# Patient Record
Sex: MALE | Race: BLACK OR AFRICAN AMERICAN | NOT HISPANIC OR LATINO | Employment: OTHER | URBAN - METROPOLITAN AREA
[De-identification: names, ages, dates, MRNs, and addresses within clinical notes are randomized per-mention and may not be internally consistent; named-entity substitution may affect disease eponyms.]

---

## 2021-07-22 ENCOUNTER — IMMUNIZATION (OUTPATIENT)
Dept: PRIMARY CARE CLINIC | Facility: CLINIC | Age: 36
End: 2021-07-22
Payer: MEDICAID

## 2021-07-22 DIAGNOSIS — Z23 NEED FOR VACCINATION: Primary | ICD-10-CM

## 2021-07-22 PROCEDURE — 91300 COVID-19, MRNA, LNP-S, PF, 30 MCG/0.3 ML DOSE VACCINE: CPT | Mod: S$GLB,,, | Performed by: INTERNAL MEDICINE

## 2021-07-22 PROCEDURE — 0001A COVID-19, MRNA, LNP-S, PF, 30 MCG/0.3 ML DOSE VACCINE: CPT | Mod: CV19,S$GLB,, | Performed by: INTERNAL MEDICINE

## 2021-07-22 PROCEDURE — 0001A COVID-19, MRNA, LNP-S, PF, 30 MCG/0.3 ML DOSE VACCINE: ICD-10-PCS | Mod: CV19,S$GLB,, | Performed by: INTERNAL MEDICINE

## 2021-07-22 PROCEDURE — 91300 COVID-19, MRNA, LNP-S, PF, 30 MCG/0.3 ML DOSE VACCINE: ICD-10-PCS | Mod: S$GLB,,, | Performed by: INTERNAL MEDICINE

## 2021-08-12 ENCOUNTER — IMMUNIZATION (OUTPATIENT)
Dept: PRIMARY CARE CLINIC | Facility: CLINIC | Age: 36
End: 2021-08-12
Payer: MEDICAID

## 2021-08-12 DIAGNOSIS — Z23 NEED FOR VACCINATION: Primary | ICD-10-CM

## 2021-08-12 PROCEDURE — 91300 COVID-19, MRNA, LNP-S, PF, 30 MCG/0.3 ML DOSE VACCINE: CPT | Mod: S$GLB,,, | Performed by: INTERNAL MEDICINE

## 2021-08-12 PROCEDURE — 91300 COVID-19, MRNA, LNP-S, PF, 30 MCG/0.3 ML DOSE VACCINE: ICD-10-PCS | Mod: S$GLB,,, | Performed by: INTERNAL MEDICINE

## 2021-08-12 PROCEDURE — 0002A COVID-19, MRNA, LNP-S, PF, 30 MCG/0.3 ML DOSE VACCINE: ICD-10-PCS | Mod: CV19,S$GLB,, | Performed by: INTERNAL MEDICINE

## 2021-08-12 PROCEDURE — 0002A COVID-19, MRNA, LNP-S, PF, 30 MCG/0.3 ML DOSE VACCINE: CPT | Mod: CV19,S$GLB,, | Performed by: INTERNAL MEDICINE

## 2025-03-05 ENCOUNTER — HOSPITAL ENCOUNTER (EMERGENCY)
Facility: HOSPITAL | Age: 40
Discharge: PSYCHIATRIC OR SUBSTANCE USE FACILITY | End: 2025-03-05
Attending: EMERGENCY MEDICINE
Payer: COMMERCIAL

## 2025-03-05 VITALS
SYSTOLIC BLOOD PRESSURE: 124 MMHG | DIASTOLIC BLOOD PRESSURE: 82 MMHG | OXYGEN SATURATION: 97 % | RESPIRATION RATE: 18 BRPM | HEART RATE: 93 BPM | TEMPERATURE: 98 F

## 2025-03-05 DIAGNOSIS — Z00.8 MEDICAL CLEARANCE FOR PSYCHIATRIC ADMISSION: ICD-10-CM

## 2025-03-05 DIAGNOSIS — F29 PSYCHOSIS, UNSPECIFIED PSYCHOSIS TYPE: ICD-10-CM

## 2025-03-05 DIAGNOSIS — F22 PARANOIA: Primary | ICD-10-CM

## 2025-03-05 DIAGNOSIS — Z91.148 H/O MEDICATION NONCOMPLIANCE: ICD-10-CM

## 2025-03-05 DIAGNOSIS — R07.9 CHEST PAIN: ICD-10-CM

## 2025-03-05 DIAGNOSIS — R82.5 POSITIVE URINE DRUG SCREEN: ICD-10-CM

## 2025-03-05 LAB
ALBUMIN SERPL BCP-MCNC: 3.9 G/DL (ref 3.5–5.2)
ALP SERPL-CCNC: 168 U/L (ref 40–150)
ALT SERPL W/O P-5'-P-CCNC: 71 U/L (ref 10–44)
AMPHET+METHAMPHET UR QL: NEGATIVE
ANION GAP SERPL CALC-SCNC: 10 MMOL/L (ref 8–16)
APAP SERPL-MCNC: <3 UG/ML (ref 10–20)
AST SERPL-CCNC: 87 U/L (ref 10–40)
BACTERIA #/AREA URNS HPF: ABNORMAL /HPF
BARBITURATES UR QL SCN>200 NG/ML: NEGATIVE
BASOPHILS # BLD AUTO: 0.05 K/UL (ref 0–0.2)
BASOPHILS NFR BLD: 0.6 % (ref 0–1.9)
BENZODIAZ UR QL SCN>200 NG/ML: NEGATIVE
BILIRUB SERPL-MCNC: 0.7 MG/DL (ref 0.1–1)
BILIRUB UR QL STRIP: NEGATIVE
BNP SERPL-MCNC: <10 PG/ML (ref 0–99)
BUN SERPL-MCNC: 10 MG/DL (ref 6–20)
BZE UR QL SCN: NEGATIVE
CALCIUM SERPL-MCNC: 9.1 MG/DL (ref 8.7–10.5)
CANNABINOIDS UR QL SCN: ABNORMAL
CHLORIDE SERPL-SCNC: 101 MMOL/L (ref 95–110)
CLARITY UR: CLEAR
CO2 SERPL-SCNC: 24 MMOL/L (ref 23–29)
COLOR UR: YELLOW
CREAT SERPL-MCNC: 1.1 MG/DL (ref 0.5–1.4)
CREAT UR-MCNC: 128.1 MG/DL (ref 23–375)
DIFFERENTIAL METHOD BLD: ABNORMAL
EOSINOPHIL # BLD AUTO: 0.5 K/UL (ref 0–0.5)
EOSINOPHIL NFR BLD: 5.5 % (ref 0–8)
ERYTHROCYTE [DISTWIDTH] IN BLOOD BY AUTOMATED COUNT: 11.2 % (ref 11.5–14.5)
EST. GFR  (NO RACE VARIABLE): >60 ML/MIN/1.73 M^2
ETHANOL SERPL-MCNC: <10 MG/DL
GLUCOSE SERPL-MCNC: 91 MG/DL (ref 70–110)
GLUCOSE UR QL STRIP: NEGATIVE
HCT VFR BLD AUTO: 39.4 % (ref 40–54)
HGB BLD-MCNC: 13 G/DL (ref 14–18)
HGB UR QL STRIP: ABNORMAL
IMM GRANULOCYTES # BLD AUTO: 0.02 K/UL (ref 0–0.04)
IMM GRANULOCYTES NFR BLD AUTO: 0.2 % (ref 0–0.5)
KETONES UR QL STRIP: NEGATIVE
LEUKOCYTE ESTERASE UR QL STRIP: ABNORMAL
LIPASE SERPL-CCNC: 21 U/L (ref 4–60)
LYMPHOCYTES # BLD AUTO: 2.2 K/UL (ref 1–4.8)
LYMPHOCYTES NFR BLD: 25 % (ref 18–48)
MCH RBC QN AUTO: 32.3 PG (ref 27–31)
MCHC RBC AUTO-ENTMCNC: 33 G/DL (ref 32–36)
MCV RBC AUTO: 98 FL (ref 82–98)
METHADONE UR QL SCN>300 NG/ML: NEGATIVE
MICROSCOPIC COMMENT: ABNORMAL
MONOCYTES # BLD AUTO: 0.9 K/UL (ref 0.3–1)
MONOCYTES NFR BLD: 10.4 % (ref 4–15)
NEUTROPHILS # BLD AUTO: 5.1 K/UL (ref 1.8–7.7)
NEUTROPHILS NFR BLD: 58.3 % (ref 38–73)
NITRITE UR QL STRIP: NEGATIVE
NRBC BLD-RTO: 0 /100 WBC
OPIATES UR QL SCN: NEGATIVE
PCP UR QL SCN>25 NG/ML: NEGATIVE
PH UR STRIP: 6 [PH] (ref 5–8)
PLATELET # BLD AUTO: 291 K/UL (ref 150–450)
PMV BLD AUTO: 10 FL (ref 9.2–12.9)
POTASSIUM SERPL-SCNC: 3.5 MMOL/L (ref 3.5–5.1)
PROT SERPL-MCNC: 8.2 G/DL (ref 6–8.4)
PROT UR QL STRIP: NEGATIVE
RBC # BLD AUTO: 4.02 M/UL (ref 4.6–6.2)
RBC #/AREA URNS HPF: 3 /HPF (ref 0–4)
SODIUM SERPL-SCNC: 135 MMOL/L (ref 136–145)
SP GR UR STRIP: 1.01 (ref 1–1.03)
SQUAMOUS #/AREA URNS HPF: 2 /HPF
TOXICOLOGY INFORMATION: ABNORMAL
TROPONIN I SERPL DL<=0.01 NG/ML-MCNC: <0.006 NG/ML (ref 0–0.03)
URN SPEC COLLECT METH UR: ABNORMAL
UROBILINOGEN UR STRIP-ACNC: NEGATIVE EU/DL
WBC # BLD AUTO: 8.77 K/UL (ref 3.9–12.7)
WBC #/AREA URNS HPF: 6 /HPF (ref 0–5)

## 2025-03-05 PROCEDURE — 80074 ACUTE HEPATITIS PANEL: CPT | Performed by: PHYSICIAN ASSISTANT

## 2025-03-05 PROCEDURE — 84484 ASSAY OF TROPONIN QUANT: CPT | Performed by: PHYSICIAN ASSISTANT

## 2025-03-05 PROCEDURE — 80307 DRUG TEST PRSMV CHEM ANLYZR: CPT | Performed by: PHYSICIAN ASSISTANT

## 2025-03-05 PROCEDURE — 63600175 PHARM REV CODE 636 W HCPCS: Performed by: PHYSICIAN ASSISTANT

## 2025-03-05 PROCEDURE — 25000003 PHARM REV CODE 250: Performed by: PHYSICIAN ASSISTANT

## 2025-03-05 PROCEDURE — 81000 URINALYSIS NONAUTO W/SCOPE: CPT | Performed by: PHYSICIAN ASSISTANT

## 2025-03-05 PROCEDURE — 80143 DRUG ASSAY ACETAMINOPHEN: CPT | Performed by: PHYSICIAN ASSISTANT

## 2025-03-05 PROCEDURE — 85025 COMPLETE CBC W/AUTO DIFF WBC: CPT | Performed by: PHYSICIAN ASSISTANT

## 2025-03-05 PROCEDURE — 82077 ASSAY SPEC XCP UR&BREATH IA: CPT | Performed by: PHYSICIAN ASSISTANT

## 2025-03-05 PROCEDURE — 83880 ASSAY OF NATRIURETIC PEPTIDE: CPT | Performed by: PHYSICIAN ASSISTANT

## 2025-03-05 PROCEDURE — 93010 ELECTROCARDIOGRAM REPORT: CPT | Mod: ,,, | Performed by: INTERNAL MEDICINE

## 2025-03-05 PROCEDURE — 99285 EMERGENCY DEPT VISIT HI MDM: CPT | Mod: 25

## 2025-03-05 PROCEDURE — 83690 ASSAY OF LIPASE: CPT | Performed by: PHYSICIAN ASSISTANT

## 2025-03-05 PROCEDURE — 93005 ELECTROCARDIOGRAM TRACING: CPT

## 2025-03-05 PROCEDURE — 96372 THER/PROPH/DIAG INJ SC/IM: CPT | Performed by: PHYSICIAN ASSISTANT

## 2025-03-05 PROCEDURE — 80053 COMPREHEN METABOLIC PANEL: CPT | Performed by: PHYSICIAN ASSISTANT

## 2025-03-05 RX ORDER — HALOPERIDOL LACTATE 5 MG/ML
5 INJECTION, SOLUTION INTRAMUSCULAR EVERY 6 HOURS PRN
Status: DISCONTINUED | OUTPATIENT
Start: 2025-03-05 | End: 2025-03-06 | Stop reason: HOSPADM

## 2025-03-05 RX ORDER — CETIRIZINE HYDROCHLORIDE 10 MG/1
10 TABLET ORAL
Status: COMPLETED | OUTPATIENT
Start: 2025-03-05 | End: 2025-03-05

## 2025-03-05 RX ORDER — HALOPERIDOL LACTATE 5 MG/ML
5 INJECTION, SOLUTION INTRAMUSCULAR
Status: DISCONTINUED | OUTPATIENT
Start: 2025-03-05 | End: 2025-03-05

## 2025-03-05 RX ORDER — DIPHENHYDRAMINE HYDROCHLORIDE 50 MG/ML
50 INJECTION, SOLUTION INTRAMUSCULAR; INTRAVENOUS DAILY PRN
Status: DISCONTINUED | OUTPATIENT
Start: 2025-03-05 | End: 2025-03-06 | Stop reason: HOSPADM

## 2025-03-05 RX ORDER — LORAZEPAM 2 MG/ML
2 INJECTION INTRAMUSCULAR
Status: DISCONTINUED | OUTPATIENT
Start: 2025-03-05 | End: 2025-03-05

## 2025-03-05 RX ORDER — DIPHENHYDRAMINE HYDROCHLORIDE 50 MG/ML
50 INJECTION, SOLUTION INTRAMUSCULAR; INTRAVENOUS
Status: DISCONTINUED | OUTPATIENT
Start: 2025-03-05 | End: 2025-03-05

## 2025-03-05 RX ORDER — LORAZEPAM 2 MG/ML
2 INJECTION INTRAMUSCULAR DAILY PRN
Status: DISCONTINUED | OUTPATIENT
Start: 2025-03-05 | End: 2025-03-06 | Stop reason: HOSPADM

## 2025-03-05 RX ADMIN — CETIRIZINE HYDROCHLORIDE 10 MG: 10 TABLET, FILM COATED ORAL at 04:03

## 2025-03-05 RX ADMIN — DIPHENHYDRAMINE HYDROCHLORIDE 50 MG: 50 INJECTION INTRAMUSCULAR; INTRAVENOUS at 04:03

## 2025-03-05 RX ADMIN — HALOPERIDOL LACTATE 5 MG: 5 INJECTION, SOLUTION INTRAMUSCULAR at 04:03

## 2025-03-05 RX ADMIN — LORAZEPAM 2 MG: 2 INJECTION INTRAMUSCULAR; INTRAVENOUS at 04:03

## 2025-03-05 NOTE — ED PROVIDER NOTES
"Encounter Date: 3/5/2025       History     Chief Complaint   Patient presents with    Psychiatric Evaluation     Pt arrived via ems, pt chief complaint is a psychiatric evaluation. Pt was sent to ed under OPC by pt uncle for statements saying he wants to die, also pt is talking to his mother that is not there. Pt has also been staying a hotel and spreading his feces all over the walls.        CC: Psychiatric Evaluation    HPI;   39 y.o. M with hx of schizophrenia and bipolar disorder presenting to ED via police with OPC for psychiatric evaluation.     History obtained from OPC:  uncle claims that patient keeps saying that he wants to die and has been verbally aggressive.  Claims the patient is over eating, rarely bathing or sleeping.  He states the patient has delusional --thinking that he is talking to his mother when he has not spoken to his mother in a month due to being kicked out of her home. Patient uncle states the patient was yellowing at people who were not there when he allowed the patient to stay with him.  He said the patient to stay at [facility] in [place] yesterday and the patient spread his feces all over the hotel room that he was staying in.      History obtained from pt:   Pt is c/o chest pain. He denies history of similar CP. Denies fever, chills, SOB, abdominal pain, nausea, vomiting. He reports he had some diarrhea this morning.Asked pt if he ate something that upset his stomach and he repeatedly keeps talking about the "keys in kitchen and the back of the kitchen."    When questioned about the incident with feces in hotel room, pt became paranoid and irritable and said "who told you that?" He denies alcohol or illicit drug use. Does smoke cigarettes. Denies SI/HI/AH/VH, sleep or appetite disturbance. Pt states he is compliant with all his medications. He cannot recall the names of them.   He states he used to live with his mother until 1 month ago. He states he stays with his uncle now but " "will not disclose what happened with his mother. He also states his uncle sent him to stay at a hotel but will not disclose why he was sent there.     He is also c/o "my sinuses." Has chronic nasal congestion, rhinorrhea. Denies difficulty breathing or swallowing, cough. Takes clarinex prn  Pt said "promise me I can go home today."  History obtained from uncle:   Uncle reports pt's mother kicked him out of her house because patient was "acting out" but unsure why. Patient has been living with uncle for 1 month "so he wouldn't be on the streets." He reports he was screaming at night and talking to his mom who is not there which has been disturbing his uncle's family. For this reason, pt's uncle brought pt to hotel.    Uncle Reports patient had IP psychiatric admission at [facility] 2 weeks ago for 1 week. Reports patient is still screaming at night and having auditory hallucinations since his discharge.    Talked to patients nurseRacquel at [phone] because uncle was unsure what the pt's medications were  She reports that he is on Trazodone 100 mg nightly and IM Invega sustenna. He had his last injection end of last month.     The history is provided by the patient, medical records and the police.     Review of patient's allergies indicates:   Allergen Reactions    Iodine and iodide containing products     Penicillins Hives    Shellfish containing products      Past Medical History:   Diagnosis Date    Bipolar 1 disorder     Gallstones     Sinus congestion     Torn ligament left knee     Past Surgical History:   Procedure Laterality Date    KNEE SURGERY  2005    left . torn ligament    TYMPANOSTOMY TUBE PLACEMENT       Family History   Problem Relation Name Age of Onset    Hypertension Mother      Arthritis Mother      Mental illness Neg Hx       Social History[1]  Review of Systems   Psychiatric/Behavioral:  Positive for agitation, behavioral problems and hallucinations. Negative for confusion, " "self-injury, sleep disturbance and suicidal ideas. The patient is not nervous/anxious.        Physical Exam     Initial Vitals [03/05/25 1446]   BP Pulse Resp Temp SpO2   119/69 101 18 98.3 °F (36.8 °C) 99 %      MAP       --         Physical Exam    Nursing note and vitals reviewed.  Constitutional: He appears well-developed and well-nourished. No distress.   HENT:   Head: Normocephalic.   Right Ear: External ear normal.   Left Ear: External ear normal. Mouth/Throat: Uvula is midline and mucous membranes are normal. No oropharyngeal exudate, posterior oropharyngeal edema or posterior oropharyngeal erythema.   Postnasal drip  Pt spitting clear sputum into emesis bag     Eyes: Conjunctivae are normal.   Pulmonary/Chest: No respiratory distress.   Abdominal: Abdomen is flat. There is no abdominal tenderness.   No right CVA tenderness.  No left CVA tenderness. There is no rebound, no guarding, no tenderness at McBurney's point and negative Davis's sign. negative Rovsing's sign  Musculoskeletal:         General: Normal range of motion.     Neurological: He is alert.   Skin: Skin is warm and dry. No rash noted.   Psychiatric: His speech is tangential. He is agitated. He is not actively hallucinating. Thought content is paranoid. Cognition and memory are impaired. He expresses no homicidal and no suicidal ideation. He expresses no suicidal plans and no homicidal plans.   Irritable.     Talking about "keys in the kitchen" when asked about food he ate yesterday             ED Course   Procedures  Labs Reviewed   CBC W/ AUTO DIFFERENTIAL - Abnormal       Result Value    WBC 8.77      RBC 4.02 (*)     Hemoglobin 13.0 (*)     Hematocrit 39.4 (*)     MCV 98      MCH 32.3 (*)     MCHC 33.0      RDW 11.2 (*)     Platelets 291      MPV 10.0      Immature Granulocytes 0.2      Gran # (ANC) 5.1      Immature Grans (Abs) 0.02      Lymph # 2.2      Mono # 0.9      Eos # 0.5      Baso # 0.05      nRBC 0      Gran % 58.3      Lymph % " 25.0      Mono % 10.4      Eosinophil % 5.5      Basophil % 0.6      Differential Method Automated     COMPREHENSIVE METABOLIC PANEL - Abnormal    Sodium 135 (*)     Potassium 3.5      Chloride 101      CO2 24      Glucose 91      BUN 10      Creatinine 1.1      Calcium 9.1      Total Protein 8.2      Albumin 3.9      Total Bilirubin 0.7      Alkaline Phosphatase 168 (*)     AST 87 (*)     ALT 71 (*)     eGFR >60      Anion Gap 10     URINALYSIS, REFLEX TO URINE CULTURE - Abnormal    Specimen UA Urine, Clean Catch      Color, UA Yellow      Appearance, UA Clear      pH, UA 6.0      Specific Gravity, UA 1.010      Protein, UA Negative      Glucose, UA Negative      Ketones, UA Negative      Bilirubin (UA) Negative      Occult Blood UA 2+ (*)     Nitrite, UA Negative      Urobilinogen, UA Negative      Leukocytes, UA 2+ (*)     Narrative:     Specimen Source->Urine   DRUG SCREEN PANEL, URINE EMERGENCY - Abnormal    Benzodiazepines Negative      Methadone metabolites Negative      Cocaine (Metab.) Negative      Opiate Scrn, Ur Negative      Barbiturate Screen, Ur Negative      Amphetamine Screen, Ur Negative      THC Presumptive Positive (*)     Phencyclidine Negative      Creatinine, Urine 128.1      Toxicology Information SEE COMMENT      Narrative:     Specimen Source->Urine   ACETAMINOPHEN LEVEL - Abnormal    Acetaminophen (Tylenol), Serum <3.0 (*)    URINALYSIS MICROSCOPIC - Abnormal    RBC, UA 3      WBC, UA 6 (*)     Bacteria Rare      Squam Epithel, UA 2      Microscopic Comment SEE COMMENT      Narrative:     Specimen Source->Urine   ALCOHOL,MEDICAL (ETHANOL)    Alcohol, Serum <10     LIPASE    Lipase 21     TROPONIN I    Troponin I <0.006     B-TYPE NATRIURETIC PEPTIDE    BNP <10     HEPATITIS PANEL, ACUTE   C. TRACHOMATIS/N. GONORRHOEAE BY AMP DNA   HEPATITIS PANEL, ACUTE     EKG Readings: (Independently Interpreted)   Initial Reading: No STEMI. Rhythm: Normal Sinus Rhythm. Heart Rate: 74. Ectopy: No Ectopy.  Conduction: Normal. Axis: Right Axis Deviation. Other Impression:  QRS 86 Qtc 437   Independent interpretation         Imaging Results              X-Ray Chest AP Portable (Final result)  Result time 03/05/25 15:48:44      Final result by Gonzalez Arauz III, MD (03/05/25 15:48:44)                   Impression:      No acute process seen.      Electronically signed by: Gonzalez Arauz MD  Date:    03/05/2025  Time:    15:48               Narrative:    EXAMINATION:  XR CHEST AP PORTABLE    CLINICAL HISTORY:  Chest pain, unspecified    FINDINGS:  Chest chest one view:    Heart size is normal.  Lungs are clear and the bones are noncontributory.                                       Medications   diphenhydrAMINE injection 50 mg (has no administration in time range)   LORazepam injection 2 mg (has no administration in time range)   haloperidol lactate injection 5 mg (has no administration in time range)   cetirizine tablet 10 mg (10 mg Oral Given 3/5/25 1606)     Medical Decision Making  Ddx includes acute psychotic episode, SI/danger to self, HI/danger to others, but also toxidrome, withdrawal (eg from EtOH or BZD therapy), electrolyte derangement, serotonin syndrome, unusual pathology like anti-NMDA receptor encephalitis, other.  Will get basic screening psychiatric labs on the patient and reassess.  Will sedate patient if necessary.  Patient was placed on a PEC due to gravely disabled, psychosis with delusions. Pt is calm currently but is paranoid and appears to be on edge.   B52 PRN ordered for nondirectible agitation    Pt then sitting with his legs crossed and placing his forehead against the wall.    Patient underwent a work up in the emergency department including labs. UDS positive for THC. Troponin normal. Doubt ACS. BNP normal. Doubt CHF. CXR negative for PNA PTX or acute abnormality. Heart score 0. EKG with no malignant arrhythmia or acute ischemia. LFTs slightly elevated however has been higher in the  "past. Hepatitis panel added. Abdomen soft nontender. Denies abdominal pain. Doubt cholecystitis, choledocholithiasis cholangitis or acute abdomen. May be 2/2 alcohol use. no acute organic cause of the patient's current psychiatric illness has been identified at this time. Incidental finding of sterile pyuria. GC sent.     Patient medically cleared for psychiatric evaluation.     1638-Pt is angry and requesting to go home. He states "you promised me I could go home you told me" which I did not tell the pt. He is asking to call his uncle on his phone which I informed the patient he can not do.  He is asked me to tell his uncle that he will take his medications and behave. He states his uncle and the officers told him he could go home today, not tomorrow and that he is not here to stay    Instructed to give B52 due to agitation  Please put in 35 minutes of critical care due to patient having a high risk of respiratory failure.   Separate from teaching and exclusive of procedure and ekg time  Includes:  Time at bedside  Time reviewing test results  Time discussing case with staff  Time documenting the medical record  Time spent with family members  Time spent with consults  Management     Discussed with Dr. Bundy who agrees with assessment and plan.     Amount and/or Complexity of Data Reviewed  Labs: ordered.  Radiology: ordered.    Risk  OTC drugs.  Prescription drug management.      Additional MDM:   Heart Score:    History:          Slightly suspicious.  ECG:             Normal  Age:               Less than 45 years  Risk factors: no risk factors known  Troponin:       Less than or equal to normal limit  Heart Score = 0                                       Clinical Impression:  Final diagnoses:  [R07.9] Chest pain  [Z00.8] Medical clearance for psychiatric admission  [R82.5] Positive urine drug screen  [F29] Psychosis, unspecified psychosis type  [F22] Paranoia (Primary)  [Z91.148] H/O medication noncompliance   "        ED Disposition Condition    Transfer to Psych Facility Stable          ED Prescriptions    None       Follow-up Information    None            Bree Nelson PA-C  03/05/25 1634       Bree Nelson PA-C  03/05/25 1640         [1]   Social History  Tobacco Use    Smoking status: Every Day     Current packs/day: 1.00     Average packs/day: 1 pack/day for 10.0 years (10.0 ttl pk-yrs)     Types: Cigarettes   Substance Use Topics    Alcohol use: No     Alcohol/week: 0.0 standard drinks of alcohol    Drug use: No        Bree Nelson PA-C  03/05/25 1650       Bree Nelson PA-C  03/05/25 1650

## 2025-03-06 LAB
HAV IGM SERPL QL IA: NORMAL
HBV CORE IGM SERPL QL IA: NORMAL
HBV SURFACE AG SERPL QL IA: NORMAL
HCV AB SERPL QL IA: NORMAL
OHS QRS DURATION: 86 MS
OHS QTC CALCULATION: 437 MS

## 2025-03-06 NOTE — ED NOTES
Pt assisted to EMS stretcher without incident. Valuables and belongings given to EMS. Hospital security  escorted  pt to EMS unit without incident or change in condition. Pt denies notifying NOK of transfer.

## 2025-03-06 NOTE — ED NOTES
Assumed care. Pt resting quietly in bed. NAD noted. 1:1 sitter at bedside. Pending psych placement.

## 2025-07-22 ENCOUNTER — HOSPITAL ENCOUNTER (EMERGENCY)
Facility: HOSPITAL | Age: 40
Discharge: HOME | End: 2025-07-22
Attending: STUDENT IN AN ORGANIZED HEALTH CARE EDUCATION/TRAINING PROGRAM
Payer: COMMERCIAL

## 2025-07-22 VITALS
OXYGEN SATURATION: 98 % | DIASTOLIC BLOOD PRESSURE: 67 MMHG | HEART RATE: 86 BPM | BODY MASS INDEX: 32.2 KG/M2 | RESPIRATION RATE: 18 BRPM | TEMPERATURE: 98 F | WEIGHT: 230 LBS | HEIGHT: 71 IN | SYSTOLIC BLOOD PRESSURE: 109 MMHG

## 2025-07-22 DIAGNOSIS — Z04.6 ENCOUNTER FOR GENERAL PSYCHIATRIC EXAMINATION, REQUESTED BY AUTHORITY: Primary | ICD-10-CM

## 2025-07-22 LAB
ABSOLUTE EOSINOPHIL (OHS): 0.79 K/UL
ABSOLUTE MONOCYTE (OHS): 0.61 K/UL (ref 0.3–1)
ABSOLUTE NEUTROPHIL COUNT (OHS): 3.26 K/UL (ref 1.8–7.7)
ALBUMIN SERPL BCP-MCNC: 3.5 G/DL (ref 3.5–5.2)
ALP SERPL-CCNC: 138 UNIT/L (ref 40–150)
ALT SERPL W/O P-5'-P-CCNC: 35 UNIT/L (ref 10–44)
AMPHET UR QL SCN: NEGATIVE
ANION GAP (OHS): 9 MMOL/L (ref 8–16)
APAP SERPL-MCNC: <3 UG/ML (ref 10–20)
AST SERPL-CCNC: 32 UNIT/L (ref 11–45)
BARBITURATE SCN PRESENT UR: NEGATIVE
BASOPHILS # BLD AUTO: 0.05 K/UL
BASOPHILS NFR BLD AUTO: 0.7 %
BENZODIAZ UR QL SCN: NEGATIVE
BILIRUB SERPL-MCNC: 0.4 MG/DL (ref 0.1–1)
BILIRUB UR QL STRIP.AUTO: NEGATIVE
BUN SERPL-MCNC: 10 MG/DL (ref 6–20)
CALCIUM SERPL-MCNC: 8.8 MG/DL (ref 8.7–10.5)
CANNABINOIDS UR QL SCN: ABNORMAL
CHLORIDE SERPL-SCNC: 106 MMOL/L (ref 95–110)
CLARITY UR: CLEAR
CO2 SERPL-SCNC: 23 MMOL/L (ref 23–29)
COCAINE UR QL SCN: NEGATIVE
COLOR UR AUTO: YELLOW
CREAT SERPL-MCNC: 1.2 MG/DL (ref 0.5–1.4)
CREAT UR-MCNC: 228.7 MG/DL (ref 23–375)
CTP QC/QA: YES
ERYTHROCYTE [DISTWIDTH] IN BLOOD BY AUTOMATED COUNT: 12.1 % (ref 11.5–14.5)
ETHANOL SERPL-MCNC: <10 MG/DL
GFR SERPLBLD CREATININE-BSD FMLA CKD-EPI: >60 ML/MIN/1.73/M2
GLUCOSE SERPL-MCNC: 104 MG/DL (ref 70–110)
GLUCOSE UR QL STRIP: NEGATIVE
HCT VFR BLD AUTO: 39.8 % (ref 40–54)
HGB BLD-MCNC: 12.5 GM/DL (ref 14–18)
HGB UR QL STRIP: NEGATIVE
IMM GRANULOCYTES # BLD AUTO: 0.02 K/UL (ref 0–0.04)
IMM GRANULOCYTES NFR BLD AUTO: 0.3 % (ref 0–0.5)
KETONES UR QL STRIP: NEGATIVE
LEUKOCYTE ESTERASE UR QL STRIP: NEGATIVE
LYMPHOCYTES # BLD AUTO: 2.07 K/UL (ref 1–4.8)
MCH RBC QN AUTO: 31.1 PG (ref 27–31)
MCHC RBC AUTO-ENTMCNC: 31.4 G/DL (ref 32–36)
MCV RBC AUTO: 99 FL (ref 82–98)
METHADONE UR QL SCN: NEGATIVE
NITRITE UR QL STRIP: NEGATIVE
NUCLEATED RBC (/100WBC) (OHS): 0 /100 WBC
OPIATES UR QL SCN: NEGATIVE
PCP UR QL: NEGATIVE
PH UR STRIP: 6 [PH]
PLATELET # BLD AUTO: 253 K/UL (ref 150–450)
PMV BLD AUTO: 10.4 FL (ref 9.2–12.9)
POTASSIUM SERPL-SCNC: 4.4 MMOL/L (ref 3.5–5.1)
PROT SERPL-MCNC: 7.9 GM/DL (ref 6–8.4)
PROT UR QL STRIP: NEGATIVE
RBC # BLD AUTO: 4.02 M/UL (ref 4.6–6.2)
RELATIVE EOSINOPHIL (OHS): 11.6 %
RELATIVE LYMPHOCYTE (OHS): 30.4 % (ref 18–48)
RELATIVE MONOCYTE (OHS): 9 % (ref 4–15)
RELATIVE NEUTROPHIL (OHS): 48 % (ref 38–73)
SARS-COV-2 RDRP RESP QL NAA+PROBE: NEGATIVE
SODIUM SERPL-SCNC: 138 MMOL/L (ref 136–145)
SP GR UR STRIP: 1.02
UROBILINOGEN UR STRIP-ACNC: NEGATIVE EU/DL
WBC # BLD AUTO: 6.8 K/UL (ref 3.9–12.7)

## 2025-07-22 PROCEDURE — 80307 DRUG TEST PRSMV CHEM ANLYZR: CPT | Performed by: STUDENT IN AN ORGANIZED HEALTH CARE EDUCATION/TRAINING PROGRAM

## 2025-07-22 PROCEDURE — 82077 ASSAY SPEC XCP UR&BREATH IA: CPT | Performed by: STUDENT IN AN ORGANIZED HEALTH CARE EDUCATION/TRAINING PROGRAM

## 2025-07-22 PROCEDURE — 80143 DRUG ASSAY ACETAMINOPHEN: CPT | Performed by: STUDENT IN AN ORGANIZED HEALTH CARE EDUCATION/TRAINING PROGRAM

## 2025-07-22 PROCEDURE — 80053 COMPREHEN METABOLIC PANEL: CPT | Performed by: STUDENT IN AN ORGANIZED HEALTH CARE EDUCATION/TRAINING PROGRAM

## 2025-07-22 PROCEDURE — 99285 EMERGENCY DEPT VISIT HI MDM: CPT

## 2025-07-22 PROCEDURE — G0425 INPT/ED TELECONSULT30: HCPCS | Mod: 95,,, | Performed by: PSYCHIATRY & NEUROLOGY

## 2025-07-22 PROCEDURE — 85025 COMPLETE CBC W/AUTO DIFF WBC: CPT | Performed by: STUDENT IN AN ORGANIZED HEALTH CARE EDUCATION/TRAINING PROGRAM

## 2025-07-22 PROCEDURE — 87635 SARS-COV-2 COVID-19 AMP PRB: CPT | Performed by: STUDENT IN AN ORGANIZED HEALTH CARE EDUCATION/TRAINING PROGRAM

## 2025-07-22 PROCEDURE — 81003 URINALYSIS AUTO W/O SCOPE: CPT | Performed by: STUDENT IN AN ORGANIZED HEALTH CARE EDUCATION/TRAINING PROGRAM

## 2025-07-22 RX ORDER — GABAPENTIN 300 MG/1
300 CAPSULE ORAL 3 TIMES DAILY
COMMUNITY
Start: 2025-07-18
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
## 2025-07-23 LAB — HOLD SPECIMEN: NORMAL

## 2025-07-23 NOTE — CONSULTS
[facility]   DEPARTMENT OF PSYCHIATRY    SERVICE: Telepsychiatry  ENCOUNTER: initial    TELEPSYCHIATRY (AUDIOVISUAL): Each patient who is provided psychiatric services via telehealth is: (1) informed of the relationship between the psychiatric provider and patient, as well as the respective role of any other health care staff/providers with respect to management of the patient; and (2) notified that he or she may decline to receive psychiatric services by telehealth and may withdraw from such care at any time.  Risks of telehealth include the potential for security breaches (HIPPA compliant platforms notwithstanding) and technological failure, as well as the limitations to physical examination inherent to the modality. The patient was agreeable to the use of telehealth services.    START TIME: 7/22/2025 8:00 PM  STOP TIME: 7/22/2025 8:20 PM    -- PATIENT IDENTIFIERS: Joon Hung  [id number]  1985  40 y.o.  male  -- REQUESTING PROVIDER: Katey Crain MD *  -- LOCATION OF PATIENT: ED    -- MODE OF ARRIVAL: self-presented  -- PRESENT WITH PATIENT DURING SESSION: ALONE  -- SOURCES OF INFORMATION: PATIENT  -- LOCATION OF ENCOUNTER PROVIDER: [place]  -- ENCOUNTER PROVIDER: Dragan Enriquez DO      Subjective:     History of Present Illness:  Per ED MD:  Psychiatric Evaluation        Pt w/ hx of bipolar, non-compliant w/ meds presents w/ JPSO w/ an OPC for c/o bizarre behavior, threatening to punch his mother, being loud at home and going outside naked.  Pt is not bathing or eating and is smoking marijuana.  Pt has been calm and cooperative denies HI, SI or AVH.  Denies physical complaints.       HPI     41 yo M w/bipolar disorder, presenting with JPSO w/OPC for abnormal behavior called by mother.      Collateral information from mom Zulema Monique [phone] reports that for the past week, patient has been behaving abnormally, emotionally labile, yelling at people, stripping his clothes off in the  alley way yesterday, reports that she controls his money and that he is no longer welcome in her house and that she wants him to be placed in a permanent facility.     Patient reports that his mom has been missed treating him, taking his money from disability checks, and not allowing him in his house, not giving him food, denies SI/HI/AVH, reports he is compliant with his medications, and that his  Madhavi visits him, is due for his antipsychotic injection today, reports compliance with his oral medications, though does not know the names, denies having his clothes off in the alleyway yesterday, reports he was urinating in the alleyway due to not being allowed in the house.  Denies recent illness, reports that he has been repeatedly committed to psychiatric facilities due to his mom calling OPCs on him, reports he has been sleeping well, reports intermittent cigarette and marijuana use, denies alcohol use or any other substances.     Further collateral information obtained from Mary Ellen Sons [phone] who is patient's cousin, reports that patient's mom has been keeping his medications from him and not giving them to him and taking his social security checks and neglecting to care for the patient, who is in the process of getting social security office to switch payments over to her so that she can help him.  She reports that she does not have concerns for patient regarding SI/HI, and reports he has been acting at his baseline.     On Interview:  Patient seen through teleconference this evening on my approach. He reports that he is currently in the hospital because his mother is upset with him. He reports that if he doesn't do everything that she asks him to do she will go to the coroners office to try and get him placed in the hospital. He was hospitalized last month due to similar reports from his mother. He is tired of being placed in the hospital over and over again. He was asked if he has anywhere  else to go and he stated he may need to go live with one of his friends. He reports that he is currently on a monthly Haldol injection and he is due for his next dose either today or tomorrow. He denies any thoughts of wanting to hurt himself or his mother. He states that he would like to get away from his mother but he is not sure where he can go. He would like his cousin Mary Ellen to be contacted to see if she can help with a safe discharge plan for the patient.     Collateral  Cousin Mary Ellen Munguia [phone]  Earlier today she contacted the social security office with the patient because he wanted to change his payee because he has not been receiving his benefits. His current payee is either his mother or his sister but she is concerned about the patient because he is constantly sleeping outside and he is essentially homeless. Over the past two days the patient has been staying with her. She dropped the patient off at his mother's home earlier today because she had to go to a job interview. She is not sure why his mother filed an OPC on the patient because he has been doing well over the past two days. The patient is welcomed to be discharged to her home once he leaves the hospital.     Psychiatric History:   Previous Psychiatric Hospitalizations: Yes   Previous Medication Trials: Yes   Previous Suicide Attempts: no   History of Violence: No  History of Depression: Yes   History of Argenis: Yes   History of Auditory/Visual Hallucination No  History of Delusions: No  Outpatient psychiatrist (current & past): Yes    Substance Abuse History:  Tobacco:Yes  Alcohol: Yes, rare use   Illicit Substances:Yes, Marijuana   Detox/Rehab: No    Legal History: Past charges/incarcerations: Yes     Family Psychiatric History: Unknown       Social History:  Developmental/Childhood:Achieved all developmental milestones timely  Education: College graduate   Employment Status/Finances: Disabled    Relationship Status/Sexual  "Orientation: Single   Children: 0  Housing Status: Home with mother and two sisters    history:  NO  Access to gun: NO  Cheondoism:Actively participates in organized Faith  Recreational activities: play Basketball   Patient was born and raised in [facility] Toolkit ASQ Suicide Screening Tool:  In the past few weeks, have you wished you were dead? No  In the past few weeks, have you felt that you or your family would be better off if you were dead? No  In the past week, have you been having thoughts about killing yourself? No  Have you ever tried to kill yourself? No  Are you having thoughts of killing yourself right now? No    Psychiatric Mental Status Exam:  Arousal: alert  Sensorium/Orientation: oriented to grossly intact  Behavior/Cooperation: cooperative   Speech: accented, mumbled  Language: grossly intact  Mood: " Fine "   Affect: Irritable   Thought Process: Linear   Thought Content:   Auditory hallucinations: NO  Visual hallucinations: NO  Paranoia: NO  Delusions:  NO  Suicidal ideation: NO  Homicidal ideation: NO  Attention/Concentration:  intact  Memory:    Recent:  Intact   Remote: Intact  Fund of Knowledge: Aware of current events   Abstract reasoning: Did not assess  Insight: intact  Judgment: behavior is adequate to circumstances      Past Medical History:   Past Medical History:   Diagnosis Date    Bipolar 1 disorder     Gallstones     Sinus congestion     Torn ligament left knee      Laboratory Data:   Labs Reviewed   DRUG SCREEN PANEL, URINE EMERGENCY - Abnormal       Result Value    Benzodiazepine, Urine Negative      Methadone, Urine Negative      Cocaine, Urine Negative      Opiates, Urine Negative      Barbiturates, Urine Negative      Amphetamines, Urine Negative      THC Presumptive Positive (*)     Phencyclidine, Urine Negative      Urine Creatinine 228.7      Narrative:     This screen includes the following classes of drugs at the listed cut-off: " "    Benzodiazepines:        200 ng/ml   Methadone:              300 ng/ml   Cocaine metabolite:     300 ng/ml   Opiates:                300 ng/ml   Barbiturates:           200 ng/ml   Amphetamines:           1000 ng/ml   Marijuana metabs (THC): 50 ng/ml   Phencyclidine (PCP):    25 ng/ml     This is a screening test. If results do not correlate with clinical presentation, then a confirmatory send out test is advised.    This report is intended for use in clinical monitoring and management of patients. It is not intended for use in employment related drug testing."   ACETAMINOPHEN LEVEL - Abnormal    Acetaminophen Level <3.0 (*)    CBC WITH DIFFERENTIAL - Abnormal    WBC 6.80      RBC 4.02 (*)     HGB 12.5 (*)     HCT 39.8 (*)     MCV 99 (*)     MCH 31.1 (*)     MCHC 31.4 (*)     RDW 12.1      Platelet Count 253      MPV 10.4      Nucleated RBC 0      Neut % 48.0      Lymph % 30.4      Mono % 9.0      Eos % 11.6 (*)     Basophil % 0.7      Imm Grans % 0.3      Neut # 3.26      Lymph # 2.07      Mono # 0.61      Eos # 0.79 (*)     Baso # 0.05      Imm Grans # 0.02     COMPREHENSIVE METABOLIC PANEL - Normal    Sodium 138      Potassium 4.4      Chloride 106      CO2 23      Glucose 104      BUN 10      Creatinine 1.2      Calcium 8.8      Protein Total 7.9      Albumin 3.5      Bilirubin Total 0.4            AST 32      ALT 35      Anion Gap 9      eGFR >60     URINALYSIS, REFLEX TO URINE CULTURE - Normal    Color, UA Yellow      Appearance, UA Clear      pH, UA 6.0      Spec Grav UA 1.025      Protein, UA Negative      Glucose, UA Negative      Ketones, UA Negative      Bilirubin, UA Negative      Blood, UA Negative      Nitrites, UA Negative      Urobilinogen, UA Negative      Leukocyte Esterase, UA Negative     ALCOHOL,MEDICAL (ETHANOL) - Normal    Alcohol, Serum <10     SARS-COV-2 RDRP GENE - Normal    POC Rapid COVID Negative       Acceptable Yes      Narrative:     This test utilizes " isothermal nucleic acid amplification technology to detect the SARS-CoV-2 RdRp nucleic acid segment. The analytical sensitivity (limit of detection) is 500 copies/swab.     A POSITIVE result is indicative of the presence of SARS-CoV-2 RNA; clinical correlation with patient history and other diagnostic information is necessary to determine patient infection status.    A NEGATIVE result means that SARS-CoV-2 nucleic acids are not present above the limit of detection. A NEGATIVE result should be treated as presumptive. It does not rule out the possibility of COVID-19 and should not be the sole basis for treatment decisions. If COVID-19 is strongly suspected based on clinical and exposure history, re-testing using an alternate molecular assay should be considered.     Commercial kits are provided by [employer].        CBC W/ AUTO DIFFERENTIAL    Narrative:     The following orders were created for panel order CBC auto differential.  Procedure                               Abnormality         Status                     ---------                               -----------         ------                     CBC with Differential[[id number]]       Abnormal            Final result                 Please view results for these tests on the individual orders.   GREY TOP URINE HOLD       Neurological History:  Seizures: No  Head trauma: Yes    Allergies:   Review of patient's allergies indicates:   Allergen Reactions    Iodine and iodide containing products     Penicillins Hives    Shellfish containing products        Medications in ER: Medications - No data to display    Medications at home:     No new subjective & objective note has been filed under this hospital service since the last note was generated.      Assessment - Diagnosis - Goals:     Diagnosis/Impression: Patient is a 40 year old man with a past psychiatric history of Schizoaffective Disorder who presented to the ED involuntarily on OPC for aggressive  behavior. On examination the patient is linear and cooperative. He appears irritable due to the circumstances and states that his mother is constantly placing him in the hospital. He denies any SI/HI/AVH. Collateral from the patient's cousin supports his discharge into her care. She believes that the patient's mother and sister have been neglecting the patient and taking advantage of his finances since they are his payee. She does not believe the patient has any safety concerns and she would like to become his payee to make sure he is getting assisted properly.    Rec: Patient does not meet criteria for PEC as the patient is not a danger to self, others, or gravely disabled. Patient is clear from a psychiatric standpoint and can be discharged once medically stable.      Plan of Care communicated to: Dr. Paras Enriquez, DO   Psychiatry  [facility]    Consults  [facility] EMERGENCY DEPARTMENT

## 2025-07-23 NOTE — DISCHARGE INSTRUCTIONS

## 2025-07-23 NOTE — ED NOTES
Every 10 to 15 min he make a weird noise with his mouth. And beats on the bed also he claps he hands

## 2025-07-29 ENCOUNTER — HOSPITAL ENCOUNTER (EMERGENCY)
Facility: HOSPITAL | Age: 40
Discharge: HOME | End: 2025-07-30
Attending: STUDENT IN AN ORGANIZED HEALTH CARE EDUCATION/TRAINING PROGRAM
Payer: COMMERCIAL

## 2025-07-29 DIAGNOSIS — R93.5 ABNORMAL CT OF THE ABDOMEN: Primary | ICD-10-CM

## 2025-07-29 DIAGNOSIS — F31.9 BIPOLAR AFFECTIVE DISORDER, REMISSION STATUS UNSPECIFIED: ICD-10-CM

## 2025-07-29 DIAGNOSIS — Z59.819 HOUSING INSTABILITY: ICD-10-CM

## 2025-07-29 DIAGNOSIS — R07.9 CHEST PAIN: ICD-10-CM

## 2025-07-29 DIAGNOSIS — R07.89 CHEST DISCOMFORT: ICD-10-CM

## 2025-07-29 LAB
ABSOLUTE EOSINOPHIL (OHS): 1.16 K/UL
ABSOLUTE MONOCYTE (OHS): 0.82 K/UL (ref 0.3–1)
ABSOLUTE NEUTROPHIL COUNT (OHS): 2.7 K/UL (ref 1.8–7.7)
ALBUMIN SERPL BCP-MCNC: 3.7 G/DL (ref 3.5–5.2)
ALP SERPL-CCNC: 149 UNIT/L (ref 40–150)
ALT SERPL W/O P-5'-P-CCNC: 51 UNIT/L (ref 10–44)
ANION GAP (OHS): 7 MMOL/L (ref 8–16)
AST SERPL-CCNC: 46 UNIT/L (ref 11–45)
BASOPHILS # BLD AUTO: 0.04 K/UL
BASOPHILS NFR BLD AUTO: 0.6 %
BILIRUB SERPL-MCNC: 0.3 MG/DL (ref 0.1–1)
BUN SERPL-MCNC: 10 MG/DL (ref 6–20)
CALCIUM SERPL-MCNC: 9 MG/DL (ref 8.7–10.5)
CHLORIDE SERPL-SCNC: 105 MMOL/L (ref 95–110)
CO2 SERPL-SCNC: 26 MMOL/L (ref 23–29)
CREAT SERPL-MCNC: 1 MG/DL (ref 0.5–1.4)
ERYTHROCYTE [DISTWIDTH] IN BLOOD BY AUTOMATED COUNT: 11.9 % (ref 11.5–14.5)
GFR SERPLBLD CREATININE-BSD FMLA CKD-EPI: >60 ML/MIN/1.73/M2
GLUCOSE SERPL-MCNC: 83 MG/DL (ref 70–110)
HCT VFR BLD AUTO: 39.4 % (ref 40–54)
HGB BLD-MCNC: 12.6 GM/DL (ref 14–18)
IMM GRANULOCYTES # BLD AUTO: 0.01 K/UL (ref 0–0.04)
IMM GRANULOCYTES NFR BLD AUTO: 0.1 % (ref 0–0.5)
INR PPP: 0.9 (ref 0.8–1.2)
LIPASE SERPL-CCNC: 20 U/L (ref 4–60)
LYMPHOCYTES # BLD AUTO: 2.08 K/UL (ref 1–4.8)
MAGNESIUM SERPL-MCNC: 1.9 MG/DL (ref 1.6–2.6)
MCH RBC QN AUTO: 31.4 PG (ref 27–31)
MCHC RBC AUTO-ENTMCNC: 32 G/DL (ref 32–36)
MCV RBC AUTO: 98 FL (ref 82–98)
NT-PROBNP SERPL-MCNC: 120 PG/ML
NUCLEATED RBC (/100WBC) (OHS): 0 /100 WBC
PLATELET # BLD AUTO: 242 K/UL (ref 150–450)
PMV BLD AUTO: 10.4 FL (ref 9.2–12.9)
POTASSIUM SERPL-SCNC: 4.2 MMOL/L (ref 3.5–5.1)
PROT SERPL-MCNC: 7.7 GM/DL (ref 6–8.4)
PROTHROMBIN TIME: 10.1 SECONDS (ref 9–12.5)
RBC # BLD AUTO: 4.01 M/UL (ref 4.6–6.2)
RELATIVE EOSINOPHIL (OHS): 17 %
RELATIVE LYMPHOCYTE (OHS): 30.5 % (ref 18–48)
RELATIVE MONOCYTE (OHS): 12 % (ref 4–15)
RELATIVE NEUTROPHIL (OHS): 39.8 % (ref 38–73)
SALICYLATES SERPL-MCNC: <5 MG/DL (ref 15–30)
SODIUM SERPL-SCNC: 138 MMOL/L (ref 136–145)
TROPONIN I SERPL HS-MCNC: 12 NG/L
WBC # BLD AUTO: 6.81 K/UL (ref 3.9–12.7)

## 2025-07-29 PROCEDURE — 83735 ASSAY OF MAGNESIUM: CPT | Performed by: EMERGENCY MEDICINE

## 2025-07-29 PROCEDURE — 25000003 PHARM REV CODE 250: Performed by: STUDENT IN AN ORGANIZED HEALTH CARE EDUCATION/TRAINING PROGRAM

## 2025-07-29 PROCEDURE — 99285 EMERGENCY DEPT VISIT HI MDM: CPT | Mod: 25

## 2025-07-29 PROCEDURE — 82040 ASSAY OF SERUM ALBUMIN: CPT | Performed by: EMERGENCY MEDICINE

## 2025-07-29 PROCEDURE — 93005 ELECTROCARDIOGRAM TRACING: CPT

## 2025-07-29 PROCEDURE — 96374 THER/PROPH/DIAG INJ IV PUSH: CPT

## 2025-07-29 PROCEDURE — 93010 ELECTROCARDIOGRAM REPORT: CPT | Mod: ,,, | Performed by: INTERNAL MEDICINE

## 2025-07-29 PROCEDURE — 83880 ASSAY OF NATRIURETIC PEPTIDE: CPT | Performed by: EMERGENCY MEDICINE

## 2025-07-29 PROCEDURE — 80074 ACUTE HEPATITIS PANEL: CPT | Performed by: STUDENT IN AN ORGANIZED HEALTH CARE EDUCATION/TRAINING PROGRAM

## 2025-07-29 PROCEDURE — 85610 PROTHROMBIN TIME: CPT | Performed by: STUDENT IN AN ORGANIZED HEALTH CARE EDUCATION/TRAINING PROGRAM

## 2025-07-29 PROCEDURE — 84484 ASSAY OF TROPONIN QUANT: CPT | Performed by: EMERGENCY MEDICINE

## 2025-07-29 PROCEDURE — 80179 DRUG ASSAY SALICYLATE: CPT | Performed by: STUDENT IN AN ORGANIZED HEALTH CARE EDUCATION/TRAINING PROGRAM

## 2025-07-29 PROCEDURE — 85025 COMPLETE CBC W/AUTO DIFF WBC: CPT | Performed by: EMERGENCY MEDICINE

## 2025-07-29 PROCEDURE — 63600175 PHARM REV CODE 636 W HCPCS: Performed by: STUDENT IN AN ORGANIZED HEALTH CARE EDUCATION/TRAINING PROGRAM

## 2025-07-29 PROCEDURE — 25000003 PHARM REV CODE 250: Performed by: PHYSICIAN ASSISTANT

## 2025-07-29 PROCEDURE — 83690 ASSAY OF LIPASE: CPT | Performed by: EMERGENCY MEDICINE

## 2025-07-29 RX ORDER — FAMOTIDINE 10 MG/ML
20 INJECTION, SOLUTION INTRAVENOUS
Status: COMPLETED | OUTPATIENT
Start: 2025-07-29 | End: 2025-07-29

## 2025-07-29 RX ORDER — ALUMINUM HYDROXIDE, MAGNESIUM HYDROXIDE, AND SIMETHICONE 1200; 120; 1200 MG/30ML; MG/30ML; MG/30ML
30 SUSPENSION ORAL ONCE
Status: COMPLETED | OUTPATIENT
Start: 2025-07-29 | End: 2025-07-29

## 2025-07-29 RX ORDER — LIDOCAINE HYDROCHLORIDE 20 MG/ML
15 SOLUTION OROPHARYNGEAL ONCE
Status: COMPLETED | OUTPATIENT
Start: 2025-07-29 | End: 2025-07-29

## 2025-07-29 RX ORDER — ACETAMINOPHEN 500 MG
1000 TABLET ORAL
Status: COMPLETED | OUTPATIENT
Start: 2025-07-29 | End: 2025-07-29

## 2025-07-29 RX ADMIN — ALUMINUM HYDROXIDE, MAGNESIUM HYDROXIDE, AND SIMETHICONE 30 ML: 200; 200; 20 SUSPENSION ORAL at 10:07

## 2025-07-29 RX ADMIN — FAMOTIDINE 20 MG: 10 INJECTION, SOLUTION INTRAVENOUS at 11:07

## 2025-07-29 RX ADMIN — LIDOCAINE HYDROCHLORIDE 15 ML: 20 SOLUTION ORAL at 10:07

## 2025-07-29 RX ADMIN — ACETAMINOPHEN 1000 MG: 500 TABLET, FILM COATED ORAL at 10:07

## 2025-07-29 SDOH — SOCIAL DETERMINANTS OF HEALTH (SDOH): HOUSING INSTABILITY UNSPECIFIED: Z59.819

## 2025-07-30 VITALS
HEIGHT: 71 IN | SYSTOLIC BLOOD PRESSURE: 116 MMHG | TEMPERATURE: 99 F | RESPIRATION RATE: 16 BRPM | DIASTOLIC BLOOD PRESSURE: 62 MMHG | HEART RATE: 62 BPM | WEIGHT: 230 LBS | OXYGEN SATURATION: 96 % | BODY MASS INDEX: 32.2 KG/M2

## 2025-07-30 LAB
AMPHET UR QL SCN: NEGATIVE
BARBITURATE SCN PRESENT UR: NEGATIVE
BENZODIAZ UR QL SCN: NEGATIVE
BILIRUB UR QL STRIP.AUTO: NEGATIVE
CANNABINOIDS UR QL SCN: ABNORMAL
CLARITY UR: CLEAR
COCAINE UR QL SCN: NEGATIVE
COLOR UR AUTO: YELLOW
CREAT UR-MCNC: 199.9 MG/DL (ref 23–375)
GLUCOSE UR QL STRIP: NEGATIVE
HAV IGM SERPL QL IA: NORMAL
HBV CORE IGM SERPL QL IA: NORMAL
HBV SURFACE AG SERPL QL IA: NORMAL
HCV AB SERPL QL IA: NORMAL
HGB UR QL STRIP: NEGATIVE
KETONES UR QL STRIP: NEGATIVE
LEUKOCYTE ESTERASE UR QL STRIP: NEGATIVE
METHADONE UR QL SCN: NEGATIVE
NITRITE UR QL STRIP: NEGATIVE
OHS QRS DURATION: 78 MS
OHS QTC CALCULATION: 409 MS
OPIATES UR QL SCN: NEGATIVE
PCP UR QL: NEGATIVE
PH UR STRIP: 7 [PH]
PROT UR QL STRIP: NEGATIVE
SP GR UR STRIP: 1.02
TROPONIN I SERPL HS-MCNC: 11 NG/L
UROBILINOGEN UR STRIP-ACNC: NEGATIVE EU/DL

## 2025-07-30 PROCEDURE — 80307 DRUG TEST PRSMV CHEM ANLYZR: CPT | Performed by: STUDENT IN AN ORGANIZED HEALTH CARE EDUCATION/TRAINING PROGRAM

## 2025-07-30 PROCEDURE — 81003 URINALYSIS AUTO W/O SCOPE: CPT | Performed by: EMERGENCY MEDICINE

## 2025-07-30 RX ORDER — FAMOTIDINE 20 MG/1
20 TABLET, FILM COATED ORAL 2 TIMES DAILY
Qty: 60 TABLET | Refills: 0 | Status: ON HOLD | OUTPATIENT
Start: 2025-07-30 | End: 2025-08-29

## 2025-07-30 NOTE — DISCHARGE INSTRUCTIONS
Guide to homeless services in the  [place]        EMERGENCY SHELTERS  A. [facility] for youth  [address]  [phone] Ages <22  Walk in 24/7   B. [facility] For men  [address] [facility]  [phone] $5 ticket 5:30-7am and   10-11am, Photo ID & TB card required  M-Th 4:30-5:50pm, F 7-7:30pm,   Sat 6:30-7pm, Sun 7-7:30pm   B. [facility] For women  [address] [facility]  [phone]  Tickets at 5:30-7am and 10-11am,   1st 4 nights free then $5, photo ID & TB card required   C. [facility]  [address] [facility]  [phone] Arrive before 4pm for intake form   D. [facility] for individuals and families  [address]  [phone]  Walk in at 4pm (women), 4:30pm (men); free for 5 nights then minimum $10 a night; call for other available longer term housing programs   E. [facility] emergency shelter only  [address]  [phone] Call for women transition housing     SHELTERS FOR WOMEN & THEIR CHILDREN  Call for intake instructions  [facility]  [phone]    [facility]   [phone] 24/7   [facility]  [phone] 8am-5pm M-Sat     DAY PROGRAMS  A. [facility]  [address] 24/7  For youth <22; food, clothing, hygiene products, bus tickets, employment and education assistance   B. [facility]  [address] [facility]  [phone]  9am-4pm  Breakfast & lunch daily, dinner M-F  Case management, job counseling services   JOEL TORER   [address] [facility]  [phone] Meals daily 6am, 2pm, & 6pm  Clothing, case management M-F by appointment (ID/job/housing/legal assistance), mail   E. [facility]  [address] Meals M-F 7:30am, M-Sat 11:30am  Food bank M/W/Th 8:30-10:30am  Adult basic education M-Th 9am-12pm   F. [facility]ON [facility] AT [address]  [facility]  [phone] Meals Sun 8:30-9:30am   [facility]  [address] [facility]  [phone] M-F 8am-2:30pm  Meals M-F 1PM (arrive early)  Showers, laundry, hygiene kits, phones, , notary services   [facility]  [address] [facility]  [phone] M/F 10am-2pm, T/Th 1-5pm, Wed call ahead  For youth <24; case management, food/snacks, clothing, hygiene and first aid, peer health, mail, phone, internet, group activities   I. TRA[facility]EL[facility]IETY [facility]  [address] [place] [address] B  [phone] MTWF 7:30am-3:30pm,  TH 8:30am-3:30pm  Crisis intervention, employment assistance, food/clothing, hygiene kits, bus tokens, mail   J. [facility] ([facility])  [address] [facility]  [phone] ext [phone]  M-F 7:30am-4pm  Meals 10-11:30am; showers, laundry, housing assistance, employment counseling for veterans     HEALTHCARE  [facility] [address] [facility]  [phone] Primary healthcare ages <16 M-F 9am-12pm, ages 14-23 M-F 1-4pm, sexual health M/W 5-8pm, Sat 9am-1pm  Walk-in, call for appointment recommended   [facility]  [address] [facility]  [phone] STD testing M-F 8am-4:30pm  Walk-in   B. [facility]   [address] [place] [address] Primary healthcare  12-4pm   C. CLINICS AT [address] Primary healthcare Sat 10am-2pm & Sun 12-5pm, TB screening, dental care   [facility]  [address] [facility] Basic healthcare W/F 10am-12pm  Walk in before to register   J. [facility]  [address] [place] [address]/Tues/Thurs 8am-4pm  [facility] for the [facility]eless [facility] location (same services as below)   [facility]  PRIMARY HEALTHCARE  [phone]  K. [address] [facility], [phone]  L. [address], [phone] Call to schedule appointment   . [facility] FOR THE HOMELESS   [address] [facility] [address]  [phone] Primary healthcare, case  management, dental services, TB placement  Call ahead   N. [facility]  [address]  [phone] Primary & behavioral health M-F 8am-5pm, HIV screening Tues 2-4pm  Call for appointment, HIV screening walk-in    O. [facility]  [address]  [phone]   Primary & mental healthcare Tues 5:30-8pm, 1st Thurs 5:30-8pm  Immunization, prescription assistance  Walk-in, call for appointment recommended   P. [facility]  [address] [facility]  [phone] ext [phone] Prescription assistance M/W 8am-1pm, arrive <10am for same day service   Q. [facility]  [address] [facility]  [phone] Primary & mental healthcare  Call for appointment & bring letter of homeless status from shelter   [facility] 2nd & 4th Sat 8am-12pm

## 2025-07-30 NOTE — ED PROVIDER NOTES
Encounter Date: 7/29/2025    SCRIBE #1 NOTE: I, Simone Kym, am scribing for, and in the presence of,  Hannah Kenny MD. I have scribed the following portions of the note - Other sections scribed: HPI, ROS, PE.   SCRIBE #2 NOTE: I, Hussain Case Do, am scribing for, and in the presence of,  Hannah Kenny MD. I have scribed the following portions of the note - Other sections scribed: HPI, ROS, PE.     History     Chief Complaint   Patient presents with    Chest Pain     Pt arrived via EMS [id number] with CC CP x1 day. CP is sub-sternal, non-radiating, constant, burning, 3/10. Pt also reports sob, abd pain. VSS, NAD.     Patient is a 40 y.o. male, with a PMHx of anemia, bipolar, schizophrenia, cholelithiasis with cholecystitis without obstruction s/p cholecystectomy 06/04/2014, who presents to the ED with continuous burning mid-sternal chest pain onset 2 nights ago. Patient denies previous similar episodes. He states episodes are worse at night. Associated symptoms include nausea, emesis, and constipation, states his last emesis was yesterday night. He reports having hard stool for the past 2 days. Pt denies tolerating PO. Patient reports compliance with prescribed medications. Prior tx includes 8 aspirin over the course of today with some relief. No other exacerbating or alleviating factors. Denies SOB, fever, chills, congestion, diarrhea, dysuria, or other associated symptoms. Pt's abdominal surgery hx includes a cholecystectomy.    The history is provided by the patient. No  was used.     Review of patient's allergies indicates:   Allergen Reactions    Iodine and iodide containing products     Penicillins Hives    Shellfish containing products      Past Medical History:   Diagnosis Date    Bipolar 1 disorder     Gallstones     Sinus congestion     Torn ligament left knee     Past Surgical History:   Procedure Laterality Date    KNEE SURGERY  2005    left . torn ligament    TYMPANOSTOMY  TUBE PLACEMENT       Family History   Problem Relation Name Age of Onset    Hypertension Mother      Arthritis Mother      Mental illness Neg Hx       Social History[1]  Review of Systems   Constitutional:  Negative for chills and fever.   HENT:  Negative for congestion and sore throat.    Eyes:  Negative for visual disturbance.   Respiratory:  Negative for cough and shortness of breath.    Cardiovascular:  Positive for chest pain (Sternal).   Gastrointestinal:  Positive for constipation, nausea and vomiting. Negative for abdominal pain and diarrhea.   Genitourinary:  Negative for dysuria.   Skin:  Negative for rash.   Neurological:  Negative for headaches.   Psychiatric/Behavioral:  Negative for confusion.        Physical Exam     Initial Vitals [07/29/25 2019]   BP Pulse Resp Temp SpO2   118/72 80 16 98.8 °F (37.1 °C) 99 %      MAP       --         Physical Exam    Nursing note and vitals reviewed.  Constitutional: He appears well-developed. He is not diaphoretic. No distress.   HENT:   Head: Normocephalic.   Eyes: EOM are normal.   Cardiovascular:  Normal rate and regular rhythm.           No murmur heard.  Pulmonary/Chest: Effort normal and breath sounds normal. He has no wheezes. He exhibits tenderness (Sternal tenderness noted upon palpation).   Abdominal: Abdomen is soft. He exhibits no distension. There is abdominal tenderness in the epigastric area.   Musculoskeletal:         General: Normal range of motion.     Neurological: He is alert.   Skin: Skin is warm.         ED Course   Procedures  Labs Reviewed   COMPREHENSIVE METABOLIC PANEL - Abnormal       Result Value    Sodium 138      Potassium 4.2      Chloride 105      CO2 26      Glucose 83      BUN 10      Creatinine 1.0      Calcium 9.0      Protein Total 7.7      Albumin 3.7      Bilirubin Total 0.3            AST 46 (*)     ALT 51 (*)     Anion Gap 7 (*)     eGFR >60      Narrative:     Specimen slightly hemolyzed.   CBC WITH DIFFERENTIAL -  "Abnormal    WBC 6.81      RBC 4.01 (*)     HGB 12.6 (*)     HCT 39.4 (*)     MCV 98      MCH 31.4 (*)     MCHC 32.0      RDW 11.9      Platelet Count 242      MPV 10.4      Nucleated RBC 0      Neut % 39.8      Lymph % 30.5      Mono % 12.0      Eos % 17.0 (*)     Basophil % 0.6      Imm Grans % 0.1      Neut # 2.70      Lymph # 2.08      Mono # 0.82      Eos # 1.16 (*)     Baso # 0.04      Imm Grans # 0.01     DRUG SCREEN PANEL, URINE EMERGENCY - Abnormal    Benzodiazepine, Urine Negative      Methadone, Urine Negative      Cocaine, Urine Negative      Opiates, Urine Negative      Barbiturates, Urine Negative      Amphetamines, Urine Negative      THC Presumptive Positive (*)     Phencyclidine, Urine Negative      Urine Creatinine 199.9      Narrative:     This screen includes the following classes of drugs at the listed cut-off:     Benzodiazepines:        200 ng/ml   Methadone:              300 ng/ml   Cocaine metabolite:     300 ng/ml   Opiates:                300 ng/ml   Barbiturates:           200 ng/ml   Amphetamines:           1000 ng/ml   Marijuana metabs (THC): 50 ng/ml   Phencyclidine (PCP):    25 ng/ml     This is a screening test. If results do not correlate with clinical presentation, then a confirmatory send out test is advised.    This report is intended for use in clinical monitoring and management of patients. It is not intended for use in employment related drug testing."   SALICYLATE LEVEL - Abnormal    Salicylate Level <5.0 (*)    TROPONIN I HIGH SENSITIVITY - Normal    Troponin High Sensitive 12     NT-PRO NATRIURETIC PEPTIDE - Normal    NT-proBNP 120      Narrative:     NOTE:  Access complete set of age - and/or gender-specific reference intervals for this test in the [facility] Collection Manual.   LIPASE - Normal    Lipase Level 20     MAGNESIUM - Normal    Magnesium  1.9      Narrative:     Specimen slightly hemolyzed.   URINALYSIS, REFLEX TO URINE CULTURE - Normal    Color, UA " Yellow      Appearance, UA Clear      pH, UA 7.0      Spec Grav UA 1.025      Protein, UA Negative      Glucose, UA Negative      Ketones, UA Negative      Bilirubin, UA Negative      Blood, UA Negative      Nitrites, UA Negative      Urobilinogen, UA Negative      Leukocyte Esterase, UA Negative     PROTIME-INR - Normal    PT 10.1      INR 0.9     TROPONIN I HIGH SENSITIVITY - Normal    Troponin High Sensitive 11     CBC W/ AUTO DIFFERENTIAL    Narrative:     The following orders were created for panel order CBC auto differential.  Procedure                               Abnormality         Status                     ---------                               -----------         ------                     CBC with Differential[[id number]]       Abnormal            Final result                 Please view results for these tests on the individual orders.   HEPATITIS PANEL, ACUTE   GREY TOP URINE HOLD     EKG Readings: (Independently Interpreted)   Normal sinus rhythm, rate of 79, right axis deviation, normal intervals       Imaging Results              X-Ray Chest AP Portable (Final result)  Result time 07/29/25 23:52:17      Final result by Jose Rafael Reed MD (07/29/25 23:52:17)                   Impression:      No acute process.      Electronically signed by: Jose Rafael Reed MD  Date:    07/29/2025  Time:    23:52               Narrative:    EXAMINATION:  XR CHEST AP PORTABLE    CLINICAL HISTORY:  Chest pain, unspecified    TECHNIQUE:  Single frontal view of the chest was performed.    COMPARISON:  03/05/2025.    FINDINGS:  The trachea is unremarkable.  The cardiomediastinal silhouette is within normal limits.  The hilar structures are unremarkable.  There are no pleural effusions.  There is no evidence of a pneumothorax.  There is no evidence of pneumomediastinum.  No airspace opacity is present.    There is no evidence of free air beneath the hemidiaphragms.  The osseous structures are unremarkable.                                         CT Abdomen Pelvis  Without Contrast (Final result)  Result time 07/29/25 21:22:36      Final result by Blossom Hargrove MD (07/29/25 21:22:36)                   Impression:      Intrahepatic and extrahepatic biliary ductal dilatation.  Recommend outpatient ERCP or MRCP.  Cholecystectomy.    11 mm low-attenuation lesion in the posterior segment the right lobe of the liver, which is indeterminate.  Follow-up.    Thickening of the rectal wall.  Differential considerations include under distension, proctitis, neoplasia, or other etiology.    Mild prostatomegaly.    Nonspecific osseous lesions in bilateral ilium.    This report was flagged in Epic as abnormal.    This report was flagged in Epic as abnormal.      Electronically signed by: Blossom Hargrove  Date:    07/29/2025  Time:    21:22               Narrative:    EXAMINATION:  CT ABDOMEN PELVIS WITHOUT    CLINICAL HISTORY:  Nausea/vomiting.  Epigastric pain.    TECHNIQUE:  5 mm unenhanced axial images from the lung bases through the greater trochanters were performed.  Coronal and sagittal reformatted images were provided.    COMPARISON:  None.    FINDINGS:  Within the limits of a noncontrast examination, the liver is enlarged.  There is marked intrahepatic biliary ductal dilatation.  There is also extrahepatic biliary ductal dilatation.  There is a 11 mm low-attenuation lesion in the right lobe of the liver (series 2 axial image 81).    Spleen, pancreas, kidneys, and adrenal glands are unremarkable.  The gallbladder is surgically absent.    There is no gross abdominal adenopathy or ascites. There is several subcentimeter mesenteric lymph nodes present.  There is a tiny fat containing umbilical hernia.    The rectal wall is thickened (series 2 axial image 157).  There are no pelvic masses or adenopathy.  There is no free pelvic fluid.  The prostate gland measures up to 5.2 cm in maximal diameter.  Consider assessment of PSA.  The appendix is  "not inflamed.    At the lung bases, there is moderate bibasilar atelectatic change.    A 7 x 4 mm ovoid lesion is seen in the right iliac wing.  There is a 15 x 9 mm left iliac wing lesion with a sclerotic rim (series 2 axial image 128).  A similar lesion is seen to the right of the SI joint, but is smaller (series 2 axial image 127).                                       Medications   aluminum-magnesium hydroxide-simethicone 200-200-20 mg/5 mL suspension 30 mL (30 mLs Oral Given 7/29/25 2254)     And   LIDOcaine viscous HCl 2% oral solution 15 mL (15 mLs Oral Given 7/29/25 2254)   acetaminophen tablet 1,000 mg (1,000 mg Oral Given 7/29/25 2254)   famotidine (PF) injection 20 mg (20 mg Intravenous Given 7/29/25 2145)     Medical Decision Making  Patient is a 40 y.o. male, with a PMHx of aanemia, bipolar, schizophrenia, cholelithiasis with cholecystitis without obstruction s/p cholecystectomy 06/04/2014, who presents to the ED with continuous burning mid-sternal chest pain onset 2 nights ago.    Initial vitals reassuring. Physical exam reveals sternal chest tenderness and epigastric abdominal pain to palpation.     Patient's presentation appears consistent with GERD 1st pancreatitis.  Also considering ACS. PE less likely given no hypoxia, sats > 95%,  no tachypnea or tachycardia, lack of risk factors, Wells score 0.  Less likely dissection given  no "tearing" sensation or radiation to back, stable vitals, no focal neuro deficits, 2+ pulses in all 4 extremities. PNA less likely given lack of fever, cough, focal findings on lung ausculation. Equal bilateral breath sounds do not suggest PTX.      I will obtain the following to better assess: CBC , CMP, Trop, BNP, EKG, CXR, lipase, salicylate levels, CTA/P. Immediate interventions include famotidine, GI cocktail, Tylenol.     Amount and/or Complexity of Data Reviewed  Labs: ordered. Decision-making details documented in ED Course.  Radiology: ordered. Decision-making " details documented in ED Course.  ECG/medicine tests: ordered and independent interpretation performed. Decision-making details documented in ED Course.    Risk  OTC drugs.  Prescription drug management.  Diagnosis or treatment significantly limited by social determinants of health.            Scribe Attestation:   Scribe #1: I performed the above scribed service and the documentation accurately describes the services I performed. I attest to the accuracy of the note.  Scribe #2: I performed the above scribed service and the documentation accurately describes the services I performed. I attest to the accuracy of the note.        ED Course as of 07/30/25 0135 Wed Jul 30, 2025   0100 On re-evaluation, patient reports his pain has resolved.  He has tolerated p.o..  His workup was overall reassuring.  We will give a referral to GI, hepatology to follow up his intrahepatic ductal dilation outpatient as per radiology's recommendations.  When attempting to discuss outpatient follow up and discharge patient's notes that he does not have a place to stay.  He says he does not think his mother wants him to come back to the house.  We will give him a list of shelters he can seek housing at. [KI]      ED Course User Index  [KI] Nubia Kenny MD                               Clinical Impression:  Final diagnoses:  [R07.89] Chest discomfort  [R07.9] Chest pain  [R93.5] Abnormal CT of the abdomen (Primary)  [Z59.819] Housing instability  [F31.9] Bipolar affective disorder, remission status unspecified     IRafia MD, personally performed the services described in this documentation.  All medical record entries made by the scribe were at my direction and in my presence.  I have reviewed the chart and agree that the record reflects my personal performance and is accurate and complete.      ED Disposition Condition    Discharge Stable          ED Prescriptions       Medication Sig Dispense Start Date End Date  Auth. Provider    famotidine (PEPCID) 20 MG tablet Take 1 tablet (20 mg total) by mouth 2 (two) times daily. 60 tablet 7/30/2025 8/29/2025 Nubia Kenny MD          Follow-up Information    None                [1]   Social History  Tobacco Use    Smoking status: Every Day     Current packs/day: 1.00     Average packs/day: 1 pack/day for 10.0 years (10.0 ttl pk-yrs)     Types: Cigarettes   Substance Use Topics    Alcohol use: No     Alcohol/week: 0.0 standard drinks of alcohol    Drug use: Yes     Types: Marijuana        Nubia Kenny MD  07/30/25 0135

## 2025-07-30 NOTE — ED NOTES
Pt arrive to ED via EMS c/o cp and sob x1 day, reports current pain 3/10. Denies any other symptoms at this time. Pt denies cardiac or respiratory hx.

## 2025-07-31 LAB — HOLD SPECIMEN: NORMAL
